# Patient Record
Sex: FEMALE | Race: WHITE | NOT HISPANIC OR LATINO
[De-identification: names, ages, dates, MRNs, and addresses within clinical notes are randomized per-mention and may not be internally consistent; named-entity substitution may affect disease eponyms.]

---

## 2019-05-20 ENCOUNTER — FORM ENCOUNTER (OUTPATIENT)
Age: 29
End: 2019-05-20

## 2021-01-20 ENCOUNTER — NON-APPOINTMENT (OUTPATIENT)
Age: 31
End: 2021-01-20

## 2021-01-21 ENCOUNTER — TRANSCRIPTION ENCOUNTER (OUTPATIENT)
Age: 31
End: 2021-01-21

## 2021-01-21 ENCOUNTER — APPOINTMENT (OUTPATIENT)
Dept: BREAST CENTER | Facility: CLINIC | Age: 31
End: 2021-01-21
Payer: COMMERCIAL

## 2021-01-21 VITALS
HEART RATE: 99 BPM | WEIGHT: 115 LBS | SYSTOLIC BLOOD PRESSURE: 120 MMHG | HEIGHT: 64 IN | BODY MASS INDEX: 19.63 KG/M2 | DIASTOLIC BLOOD PRESSURE: 85 MMHG

## 2021-01-21 DIAGNOSIS — Z80.1 FAMILY HISTORY OF MALIGNANT NEOPLASM OF TRACHEA, BRONCHUS AND LUNG: ICD-10-CM

## 2021-01-21 DIAGNOSIS — Z80.8 FAMILY HISTORY OF MALIGNANT NEOPLASM OF OTHER ORGANS OR SYSTEMS: ICD-10-CM

## 2021-01-21 DIAGNOSIS — Z78.9 OTHER SPECIFIED HEALTH STATUS: ICD-10-CM

## 2021-01-21 DIAGNOSIS — Z80.7 FAMILY HISTORY OF OTHER MALIGNANT NEOPLASMS OF LYMPHOID, HEMATOPOIETIC AND RELATED TISSUES: ICD-10-CM

## 2021-01-21 PROCEDURE — 99072 ADDL SUPL MATRL&STAF TM PHE: CPT

## 2021-01-21 PROCEDURE — 99203 OFFICE O/P NEW LOW 30 MIN: CPT

## 2021-01-21 RX ORDER — LEVONORGESTREL 52 MG/1
INTRAUTERINE DEVICE INTRAUTERINE
Refills: 0 | Status: ACTIVE | COMMUNITY

## 2021-01-24 NOTE — ASSESSMENT
[FreeTextEntry1] : 30yo female w/ FHx breast ca mother 42 w/ recurrence 45 (Genetics (-)) and PAunt 50s/60s previously seen by Dr. Ramos for palpable lesion which was aspirated and yielded a proliferative breast lesion favoring fibroadenoma. Patient presents for breast cancer screening and denies skin changes or nipple discharge bilaterally. Physical examination reveals L UOQ FA confirmed on in office US. Patient to have Goran MG & US for high risk screening d/t her maternal hx of breast ca at 42. In addition, patient is to return in 6mos for MRI and re-examination.

## 2021-01-24 NOTE — PHYSICAL EXAM
[Supple] : supple [No Supraclavicular Adenopathy] : no supraclavicular adenopathy [No Cervical Adenopathy] : no cervical adenopathy [Examined in the supine and seated position] : examined in the supine and seated position [No Axillary Lymphadenopathy] : no left axillary lymphadenopathy [de-identified] : dense [de-identified] : dense UOQ [de-identified] : FNA proven FA 1.61cm x 3.7cm tranverse and 1.9cm x 3.5cm transverse on in office US

## 2021-01-24 NOTE — PAST MEDICAL HISTORY
[Menarche Age ____] : age at menarche was [unfilled] [Definite ___ (Date)] : the last menstrual period was [unfilled] [Total Preg ___] : G[unfilled] [FreeTextEntry7] : YES

## 2021-01-24 NOTE — HISTORY OF PRESENT ILLNESS
[FreeTextEntry1] : 30yo female w/ FHx breast ca mother 42 w/ recurrence 45 (Genetics (-)) and PAunt 50s/60s previously seen by Dr. Ramos for palpable lesion which was aspirated and yielded a proliferative breast lesion favoring fibroadenoma. Patient presents for breast cancer screening and denies skin changes or nipple discharge bilaterally.\par \par FERMIN Tej Schwarzck Lifetime Risk Score 28.3%

## 2021-02-03 ENCOUNTER — NON-APPOINTMENT (OUTPATIENT)
Age: 31
End: 2021-02-03

## 2021-02-15 ENCOUNTER — RESULT REVIEW (OUTPATIENT)
Age: 31
End: 2021-02-15

## 2021-02-24 ENCOUNTER — LABORATORY RESULT (OUTPATIENT)
Age: 31
End: 2021-02-24

## 2021-02-25 ENCOUNTER — APPOINTMENT (OUTPATIENT)
Dept: BREAST CENTER | Facility: CLINIC | Age: 31
End: 2021-02-25
Payer: COMMERCIAL

## 2021-02-25 VITALS
WEIGHT: 115 LBS | DIASTOLIC BLOOD PRESSURE: 81 MMHG | HEART RATE: 94 BPM | HEIGHT: 64 IN | SYSTOLIC BLOOD PRESSURE: 121 MMHG | BODY MASS INDEX: 19.63 KG/M2

## 2021-02-25 DIAGNOSIS — R92.2 INCONCLUSIVE MAMMOGRAM: ICD-10-CM

## 2021-02-25 PROCEDURE — 99213 OFFICE O/P EST LOW 20 MIN: CPT

## 2021-02-25 PROCEDURE — 99072 ADDL SUPL MATRL&STAF TM PHE: CPT

## 2021-03-03 NOTE — PHYSICAL EXAM
[Supple] : supple [No Supraclavicular Adenopathy] : no supraclavicular adenopathy [No Cervical Adenopathy] : no cervical adenopathy [Examined in the supine and seated position] : examined in the supine and seated position [No Axillary Lymphadenopathy] : no left axillary lymphadenopathy [de-identified] : dense [de-identified] : dense UOQ [de-identified] : bx proven FA 1.61cm x 2.7cm tranverse on in office US

## 2021-03-03 NOTE — PAST MEDICAL HISTORY
[Menarche Age ____] : age at menarche was [unfilled] [Total Preg ___] : G[unfilled] [Definite ___ (Date)] : the last menstrual period was [unfilled] [FreeTextEntry7] : YES

## 2021-03-03 NOTE — HISTORY OF PRESENT ILLNESS
[FreeTextEntry1] : 32yo female s/p L US bx 1:00 5FN which yielded fibroadenoma w/ FHx breast ca mother 42 w/ recurrence 45 (Genetics (-)) and PAunt 50s/60s Patient presents to discuss options for surgical excision of FA.\par \par FERMIN Tej Dupree Lifetime Risk Score 28.3%

## 2021-03-03 NOTE — ASSESSMENT
[FreeTextEntry1] : 30yo female w/ FHx breast ca mother 42 w/ recurrence 45 (Genetics (-)) and PAunt 50s/60s LOV 1/21/21 s/p L US bx 1:00 5FN which yielded fibroadenoma. Patient presents to office today to discuss options for surgical excision of FA. Physical examination reveals L UOQ FA on in office US. Reviewed imaging and pathology findings with patient and discussed, in depth, surgical options for excision of fibroadenoma. Patient wants to proceed with excision of the lesion and will obtain medical clearance and schedule the procedure today. Patient understands and agrees with the plan.\par

## 2021-03-03 NOTE — DATA REVIEWED
[FreeTextEntry1] : 5/21/19: L FNA 11:00 4FN: proliferative breast lesion, favoring fibroadenoma\par \par 1/29/21: [facility] & US ([facility]): heterogeneously dense, L - 4cm oval mass UOQ at site of palpable lump, US - R - 0.8cm cluster of cysts 10:00 5FN, 0.9cm oval hypoechoic mass or complicated cyst 11:00 1FN, L - 0.8cm hypoechoic oval mass 12:00 4FN, 0.5cm cluster of cysts 12:00 4FN, 4.3cm lobulated hypoechoic mass w/ parallel orientation 1:00 5FN, 0.8cm oval hypoechoic mass 2:00 7FN. BIRADS 4 - US bx rec for L 1:00 5FN 4.3cm mass, 6mo f/u goran targeted us rec for goran cystic and hypoechoic masses\par \par 2/15/21: L US bx: "heart shaped clip" fibroadenoma - benign and concordant, 6mo f/u targeted US rec

## 2021-03-09 ENCOUNTER — LABORATORY RESULT (OUTPATIENT)
Age: 31
End: 2021-03-09

## 2021-03-11 ENCOUNTER — TRANSCRIPTION ENCOUNTER (OUTPATIENT)
Age: 31
End: 2021-03-11

## 2021-03-12 ENCOUNTER — APPOINTMENT (OUTPATIENT)
Dept: BREAST CENTER | Facility: CLINIC | Age: 31
End: 2021-03-12

## 2021-03-12 ENCOUNTER — OUTPATIENT (OUTPATIENT)
Dept: OUTPATIENT SERVICES | Facility: HOSPITAL | Age: 31
LOS: 1 days | Discharge: HOME | End: 2021-03-12
Payer: COMMERCIAL

## 2021-03-12 ENCOUNTER — RESULT REVIEW (OUTPATIENT)
Age: 31
End: 2021-03-12

## 2021-03-12 PROCEDURE — 88307 TISSUE EXAM BY PATHOLOGIST: CPT | Mod: 26

## 2021-03-12 PROCEDURE — 76098 X-RAY EXAM SURGICAL SPECIMEN: CPT | Mod: 26

## 2021-03-12 PROCEDURE — 19120 REMOVAL OF BREAST LESION: CPT | Mod: LT

## 2021-03-12 PROCEDURE — 76998 US GUIDE INTRAOP: CPT | Mod: 26,59

## 2021-03-12 RX ORDER — OXYCODONE AND ACETAMINOPHEN 5; 325 MG/1; MG/1
5-325 TABLET ORAL
Qty: 10 | Refills: 0 | Status: DISCONTINUED | COMMUNITY
Start: 2021-03-12 | End: 2021-03-12

## 2021-03-16 LAB — SURGICAL PATHOLOGY STUDY: SIGNIFICANT CHANGE UP

## 2021-03-24 PROBLEM — Z98.890 S/P BREAST LUMPECTOMY: Status: ACTIVE | Noted: 2021-03-12

## 2021-03-24 PROBLEM — Z80.3 FAMILY HISTORY OF BREAST CANCER: Status: ACTIVE | Noted: 2021-01-21

## 2021-03-25 ENCOUNTER — APPOINTMENT (OUTPATIENT)
Dept: BREAST CENTER | Facility: CLINIC | Age: 31
End: 2021-03-25
Payer: COMMERCIAL

## 2021-03-25 VITALS
HEIGHT: 64 IN | DIASTOLIC BLOOD PRESSURE: 82 MMHG | HEART RATE: 90 BPM | WEIGHT: 115 LBS | BODY MASS INDEX: 19.63 KG/M2 | SYSTOLIC BLOOD PRESSURE: 119 MMHG

## 2021-03-25 DIAGNOSIS — Z09 ENCOUNTER FOR FOLLOW-UP EXAMINATION AFTER COMPLETED TREATMENT FOR CONDITIONS OTHER THAN MALIGNANT NEOPLASM: ICD-10-CM

## 2021-03-25 DIAGNOSIS — Z00.00 ENCOUNTER FOR GENERAL ADULT MEDICAL EXAMINATION W/OUT ABNORMAL FINDINGS: ICD-10-CM

## 2021-03-25 DIAGNOSIS — Z98.890 OTHER SPECIFIED POSTPROCEDURAL STATES: ICD-10-CM

## 2021-03-25 DIAGNOSIS — Z80.3 FAMILY HISTORY OF MALIGNANT NEOPLASM OF BREAST: ICD-10-CM

## 2021-03-25 PROCEDURE — 99024 POSTOP FOLLOW-UP VISIT: CPT

## 2021-03-25 RX ORDER — OXYCODONE AND ACETAMINOPHEN 5; 325 MG/1; MG/1
5-325 TABLET ORAL
Qty: 10 | Refills: 0 | Status: DISCONTINUED | COMMUNITY
Start: 2021-03-12 | End: 2021-03-25

## 2021-08-05 ENCOUNTER — APPOINTMENT (OUTPATIENT)
Dept: BREAST CENTER | Facility: CLINIC | Age: 31
End: 2021-08-05

## 2021-09-23 ENCOUNTER — APPOINTMENT (OUTPATIENT)
Dept: BREAST CENTER | Facility: CLINIC | Age: 31
End: 2021-09-23
Payer: COMMERCIAL

## 2021-09-23 PROCEDURE — 99213 OFFICE O/P EST LOW 20 MIN: CPT

## 2021-09-23 NOTE — DATA REVIEWED
[FreeTextEntry1] : 5/21/19: L FNA 11:00 4FN: proliferative breast lesion, favoring fibroadenoma\par \par 1/29/21: [facility] & US ([facility]): heterogeneously dense, L - 4cm oval mass UOQ at site of palpable lump, US - R - 0.8cm cluster of cysts 10:00 5FN, 0.9cm oval hypoechoic mass or complicated cyst 11:00 1FN, L - 0.8cm hypoechoic oval mass 12:00 4FN, 0.5cm cluster of cysts 12:00 4FN, 4.3cm lobulated hypoechoic mass w/ parallel orientation 1:00 5FN, 0.8cm oval hypoechoic mass 2:00 7FN. BIRADS 4 - US bx rec for L 1:00 5FN 4.3cm mass, 6mo f/u goran targeted us rec for goran cystic and hypoechoic masses\par \par 2/15/21: L US bx: "heart shaped clip" fibroadenoma - benign and concordant, 6mo f/u targeted US rec\par \par 3/12/21: L Lumpx: 4cm fibroadenoma\par \par 9/23/2021: MRI for high risk screening.  Significant background enhancement with several foci of enhancement with type III kinetics.  Given otherwise benign appearance, recommend short interval FU in 6 months; probably benign.

## 2021-09-23 NOTE — ASSESSMENT
[FreeTextEntry1] : Benign FU exam but today's MRI report recommends FU MRI in 6 months.Will go ahead and set that up.  Whether or not to repeat Mammo will depend on rec from radiology.  Patient understands and agrees with the plan.\par

## 2021-09-23 NOTE — PAST MEDICAL HISTORY
[Menarche Age ____] : age at menarche was [unfilled] [Total Preg ___] : G[unfilled] [FreeTextEntry1] : PT NO LONGER GETS MENSTRUAL DUE TO IUD [FreeTextEntry6] : no [FreeTextEntry7] : YES (MIRENA, CURRENTLY) [FreeTextEntry8] : N/A

## 2021-09-23 NOTE — HISTORY OF PRESENT ILLNESS
[FreeTextEntry1] : 32yo female LOV 3/25/21 presents for f/u evaluation s/p L lumpx 3/12/21 which yielded 4cm fibroadenoma. Patient w/ FHx breast ca mother 42 w/ recurrence 45 (Genetics (-)) and PAunt 50s/60s.\par \par FERMIN Tej Dupree Lifetime Risk Score 28.3%

## 2021-09-23 NOTE — PHYSICAL EXAM
[Supple] : supple [No Supraclavicular Adenopathy] : no supraclavicular adenopathy [No Cervical Adenopathy] : no cervical adenopathy [Examined in the supine and seated position] : examined in the supine and seated position [No Axillary Lymphadenopathy] : no left axillary lymphadenopathy [No dominant masses] : no dominant masses in right breast  [No dominant masses] : no dominant masses left breast [No Nipple Retraction] : no left nipple retraction [No Nipple Discharge] : no left nipple discharge [de-identified] : dense [de-identified] : dense UOQ [de-identified] : well-healing periareolar incision with no erythema, drainage, or dehiscence

## 2022-04-26 ENCOUNTER — APPOINTMENT (OUTPATIENT)
Dept: BREAST CENTER | Facility: CLINIC | Age: 32
End: 2022-04-26
Payer: SELF-PAY

## 2022-04-26 PROCEDURE — 99213 OFFICE O/P EST LOW 20 MIN: CPT

## 2022-04-27 NOTE — HISTORY OF PRESENT ILLNESS
[FreeTextEntry1] : 32 yo female presents for f/u evaluation s/p L lumpx 3/12/21 which yielded 4cm fibroadenoma. Patient w/ FHx breast ca mother 42 w/ recurrence 45 (Genetics (-)) and PAunt 50s/60s. MMG/US performed today BIRADS 2 Benign. Denies any palpable abnormalities, skin changes, nipple changes or nipple discharge bilaterally.\par \par FERMIN Tyrer Juleezick Lifetime Risk Score 28.3%

## 2022-04-27 NOTE — REVIEW OF SYSTEMS
[As Noted in HPI] : as noted in HPI [Negative] : Constitutional [Shortness Of Breath] : no shortness of breath

## 2022-04-27 NOTE — DATA REVIEWED
[FreeTextEntry1] : 5/21/19: L FNA 11:00 4FN: proliferative breast lesion, favoring fibroadenoma\par \par 1/29/21: [facility] & US ([facility]): heterogeneously dense, L - 4cm oval mass UOQ at site of palpable lump, US - R - 0.8cm cluster of cysts 10:00 5FN, 0.9cm oval hypoechoic mass or complicated cyst 11:00 1FN, L - 0.8cm hypoechoic oval mass 12:00 4FN, 0.5cm cluster of cysts 12:00 4FN, 4.3cm lobulated hypoechoic mass w/ parallel orientation 1:00 5FN, 0.8cm oval hypoechoic mass 2:00 7FN. BIRADS 4 - US bx rec for L 1:00 5FN 4.3cm mass, 6mo f/u goran targeted us rec for goran cystic and hypoechoic masses\par \par 2/15/21: L US bx: "heart shaped clip" fibroadenoma - benign and concordant, 6mo f/u targeted US rec\par \par 3/12/21: L Lumpx: 4cm fibroadenoma\par \par 9/23/2021: MRI for high risk screening.  Significant background enhancement with several foci of enhancement with type III kinetics.  Given otherwise benign appearance, recommend short interval FU in 6 months; probably benign.\par \par 4/26/22 B/L MMG/US ([facility]): No mammographic or US evidence of malignancy. BIRADS 2: Benign. Follow up with annual screening.\par

## 2022-04-27 NOTE — PHYSICAL EXAM
[Supple] : supple [No Supraclavicular Adenopathy] : no supraclavicular adenopathy [No Cervical Adenopathy] : no cervical adenopathy [Examined in the supine and seated position] : examined in the supine and seated position [No dominant masses] : no dominant masses in right breast  [No dominant masses] : no dominant masses left breast [No Nipple Retraction] : no left nipple retraction [No Nipple Discharge] : no left nipple discharge [No Axillary Lymphadenopathy] : no left axillary lymphadenopathy [de-identified] : dense [de-identified] : dense UOQ [de-identified] : well-healed periareolar incision with no erythema, drainage, or dehiscence

## 2022-04-27 NOTE — ASSESSMENT
[FreeTextEntry1] : 32 yo female presents for f/u evaluation s/p L lumpx 3/12/21 which yielded 4cm fibroadenoma. Patient w/ FHx breast ca mother 42 w/ recurrence 45 (Genetics (-)) and PAunt 50s/60s. MMG/US performed today BIRADS 2 Benign. Patient is without complaint, physical exam WNL, FERMIN 28.3%. Given patient's high risk status she will have B/L MRI in Sept 2022 and RTC same month for reexamination. Patient verbalized understanding and agreement of plan.\par

## 2022-09-19 ENCOUNTER — NON-APPOINTMENT (OUTPATIENT)
Age: 32
End: 2022-09-19

## 2022-10-04 ENCOUNTER — APPOINTMENT (OUTPATIENT)
Dept: BREAST CENTER | Facility: CLINIC | Age: 32
End: 2022-10-04

## 2022-10-24 ENCOUNTER — APPOINTMENT (OUTPATIENT)
Dept: BREAST CENTER | Facility: CLINIC | Age: 32
End: 2022-10-24

## 2022-10-24 ENCOUNTER — NON-APPOINTMENT (OUTPATIENT)
Age: 32
End: 2022-10-24

## 2022-10-24 VITALS
HEIGHT: 64 IN | BODY MASS INDEX: 20.14 KG/M2 | SYSTOLIC BLOOD PRESSURE: 119 MMHG | WEIGHT: 118 LBS | DIASTOLIC BLOOD PRESSURE: 77 MMHG | HEART RATE: 59 BPM

## 2022-10-24 DIAGNOSIS — Z12.39 ENCOUNTER FOR OTHER SCREENING FOR MALIGNANT NEOPLASM OF BREAST: ICD-10-CM

## 2022-10-24 PROCEDURE — 99213 OFFICE O/P EST LOW 20 MIN: CPT

## 2022-10-24 NOTE — PHYSICAL EXAM
[Supple] : supple [No Supraclavicular Adenopathy] : no supraclavicular adenopathy [No Cervical Adenopathy] : no cervical adenopathy [Examined in the supine and seated position] : examined in the supine and seated position [No dominant masses] : no dominant masses in right breast  [No dominant masses] : no dominant masses left breast [No Nipple Retraction] : no left nipple retraction [No Nipple Discharge] : no left nipple discharge [No Axillary Lymphadenopathy] : no left axillary lymphadenopathy [de-identified] : dense [de-identified] : dense UOQ [de-identified] : well-healed periareolar incision with no erythema, drainage, or dehiscence

## 2022-10-24 NOTE — ASSESSMENT
[FreeTextEntry1] : 31 yo female presents for breast cancer screening with history of L 4.0 cm fibroadenoma excision on 3/12/21. Patient w/ FHx breast ca mother 42 w/ recurrence 45 (Genetics negative) and PAunt 50s/60s. MMG/US performed 4/26/22 BIRADS 2 Benign, B/L MRI performed today report pending. Patient is without complaint, physical exam WNL. MRI report from today still pending. Will contact patient with result and if benign, she will have B/L sMMG/US in April 2023 and RTC same month for reexamination. Patient verbalized understanding and agreement of plan.\par

## 2022-10-24 NOTE — DATA REVIEWED
[FreeTextEntry1] : 5/21/19: L FNA 11:00 4FN: proliferative breast lesion, favoring fibroadenoma\par \par 1/29/21: [facility] & US ([facility]): heterogeneously dense, L - 4cm oval mass UOQ at site of palpable lump, US - R - 0.8cm cluster of cysts 10:00 5FN, 0.9cm oval hypoechoic mass or complicated cyst 11:00 1FN, L - 0.8cm hypoechoic oval mass 12:00 4FN, 0.5cm cluster of cysts 12:00 4FN, 4.3cm lobulated hypoechoic mass w/ parallel orientation 1:00 5FN, 0.8cm oval hypoechoic mass 2:00 7FN. BIRADS 4 - US bx rec for L 1:00 5FN 4.3cm mass, 6mo f/u goran targeted us rec for goran cystic and hypoechoic masses\par \par 2/15/21: L US bx: "heart shaped clip" fibroadenoma - benign and concordant, 6mo f/u targeted US rec\par \par 3/12/21: L Lumpx: 4cm fibroadenoma\par \par 9/23/2021: MRI for high risk screening.  Significant background enhancement with several foci of enhancement with type III kinetics.  Given otherwise benign appearance, recommend short interval FU in 6 months; probably benign.\par \par 4/26/22 B/L MMG/US ([facility]): No mammographic or US evidence of malignancy. BIRADS 2: Benign. Follow up with annual screening.\par \par 10/24/22 B/L MRI ([facility]): PENDING

## 2022-10-24 NOTE — HISTORY OF PRESENT ILLNESS
[FreeTextEntry1] : 31 yo female presents for breast cancer screening with history of L 4.0 cm fibroadenoma excision on 3/12/21. Patient w/ FHx breast ca mother 42 w/ recurrence 45 (Genetics negative) and PAunt 50s/60s. MMG/US performed 4/26/22 BIRADS 2 Benign, B/L MRI performed today, final report pending. Denies any palpable abnormalities, skin changes, nipple changes or nipple discharge bilaterally.\par \par FERMIN Tej Schwarzck Lifetime Risk Score 28.3%

## 2022-10-25 ENCOUNTER — NON-APPOINTMENT (OUTPATIENT)
Age: 32
End: 2022-10-25

## 2022-11-10 ENCOUNTER — NON-APPOINTMENT (OUTPATIENT)
Age: 32
End: 2022-11-10

## 2023-03-28 ENCOUNTER — NON-APPOINTMENT (OUTPATIENT)
Age: 33
End: 2023-03-28

## 2023-03-31 ENCOUNTER — NON-APPOINTMENT (OUTPATIENT)
Age: 33
End: 2023-03-31

## 2023-04-04 ENCOUNTER — APPOINTMENT (OUTPATIENT)
Dept: BREAST CENTER | Facility: CLINIC | Age: 33
End: 2023-04-04

## 2023-05-12 PROBLEM — R92.8 ABNORMAL FINDING ON BREAST IMAGING: Status: ACTIVE | Noted: 2021-02-03

## 2023-05-16 ENCOUNTER — APPOINTMENT (OUTPATIENT)
Dept: BREAST CENTER | Facility: CLINIC | Age: 33
End: 2023-05-16
Payer: COMMERCIAL

## 2023-05-16 VITALS
BODY MASS INDEX: 20.14 KG/M2 | HEIGHT: 64 IN | SYSTOLIC BLOOD PRESSURE: 115 MMHG | WEIGHT: 118 LBS | DIASTOLIC BLOOD PRESSURE: 75 MMHG | HEART RATE: 76 BPM

## 2023-05-16 DIAGNOSIS — R92.8 OTHER ABNORMAL AND INCONCLUSIVE FINDINGS ON DIAGNOSTIC IMAGING OF BREAST: ICD-10-CM

## 2023-05-16 PROCEDURE — 99213 OFFICE O/P EST LOW 20 MIN: CPT

## 2023-05-17 NOTE — HISTORY OF PRESENT ILLNESS
[FreeTextEntry1] : 34 yo female presents for breast cancer screening with history of L 4.0 cm fibroadenoma excision on 3/12/21. Patient w/ FHx breast ca mother 42 w/ recurrence 45 (Genetics negative) and PAunt 50s/60s. Denies any palpable abnormalities, skin changes, nipple changes or nipple discharge bilaterally.\par \par B/L MRI performed Nov 2022\par \par B/L sMMG/US performed today BIRADS 2. \par \par FERMIN Tej Dupree Lifetime Risk Score 28.3%

## 2023-05-17 NOTE — ASSESSMENT
[FreeTextEntry1] : 33 yo female presents for breast cancer screening with history of L 4.0 cm fibroadenoma excision on 3/12/21. Patient w/ FHx breast ca mother 42 w/ recurrence 45 (Genetics negative) and PAunt 50s/60s. B/L MRI performed Nov 2022 initially recommended biopsy which was cancelled. B/L sMMG/US performed today BIRADS 2. Patient currently undergoing workup for masses on her ovary at [facility]. Discussed importance and implication of genetic testing in regards to her family history and offspring. Patient would like to go forward with genetic counselor consult. Patient will have B/L MRI in 6 months and reexamination at this time. Patient verbalized understanding and agreement of plan.\par

## 2023-05-17 NOTE — PAST MEDICAL HISTORY
[Menarche Age ____] : age at menarche was [unfilled] [Total Preg ___] : G[unfilled] [Definite ___ (Date)] : the last menstrual period was [unfilled] [History of Hormone Replacement Treatment] : has no history of hormone replacement treatment [FreeTextEntry1] : PT NO LONGER GETS MENSTRUAL DUE TO IUD [FreeTextEntry6] : no [FreeTextEntry7] : YES (MIRENA, CURRENTLY) [FreeTextEntry8] : N/A

## 2023-11-09 ENCOUNTER — NON-APPOINTMENT (OUTPATIENT)
Age: 33
End: 2023-11-09

## 2023-11-14 ENCOUNTER — APPOINTMENT (OUTPATIENT)
Dept: BREAST CENTER | Facility: CLINIC | Age: 33
End: 2023-11-14
Payer: COMMERCIAL

## 2023-11-14 VITALS
HEIGHT: 64 IN | BODY MASS INDEX: 19.63 KG/M2 | HEART RATE: 73 BPM | SYSTOLIC BLOOD PRESSURE: 130 MMHG | DIASTOLIC BLOOD PRESSURE: 85 MMHG | WEIGHT: 115 LBS

## 2023-11-14 PROCEDURE — 99213 OFFICE O/P EST LOW 20 MIN: CPT

## 2024-05-30 PROBLEM — D24.1 FIBROADENOMA OF RIGHT BREAST: Status: ACTIVE | Noted: 2021-01-21

## 2024-05-30 PROBLEM — Z80.3 FAMILY HISTORY OF BREAST CANCER: Status: ACTIVE | Noted: 2021-01-21

## 2024-05-30 PROBLEM — Z91.89 AT HIGH RISK FOR BREAST CANCER: Status: ACTIVE | Noted: 2022-04-21

## 2024-06-04 ENCOUNTER — APPOINTMENT (OUTPATIENT)
Dept: BREAST CENTER | Facility: CLINIC | Age: 34
End: 2024-06-04
Payer: COMMERCIAL

## 2024-06-04 VITALS
WEIGHT: 118 LBS | HEIGHT: 64 IN | HEART RATE: 80 BPM | DIASTOLIC BLOOD PRESSURE: 74 MMHG | SYSTOLIC BLOOD PRESSURE: 117 MMHG | BODY MASS INDEX: 20.14 KG/M2

## 2024-06-04 DIAGNOSIS — Z91.89 OTHER SPECIFIED PERSONAL RISK FACTORS, NOT ELSEWHERE CLASSIFIED: ICD-10-CM

## 2024-06-04 DIAGNOSIS — D24.1 BENIGN NEOPLASM OF RIGHT BREAST: ICD-10-CM

## 2024-06-04 DIAGNOSIS — Z80.3 FAMILY HISTORY OF MALIGNANT NEOPLASM OF BREAST: ICD-10-CM

## 2024-06-04 PROCEDURE — 99213 OFFICE O/P EST LOW 20 MIN: CPT

## 2024-06-10 NOTE — HISTORY OF PRESENT ILLNESS
[FreeTextEntry1] : 33 yo female presents for breast cancer screening with history of L 4.0 cm fibroadenoma excision on 3/12/21. Patient w/ FHx breast ca mother 42 w/ recurrence 45 (mom and sister Genetics negative) and PAunt 50s/60s. Denies any palpable abnormalities, skin changes, nipple changes or nipple discharge bilaterally. Patient is 4 weeks pregnant at this time, due late January 2025.   B/L MRI performed Nov 2022, patient likely pregnant at LOV so MRI not performed. B/L sMMG/US 6/4/24 BIRADS 2.  FERMIN Tej [facility] Lifetime Risk Score 28.3%

## 2024-06-10 NOTE — ASSESSMENT
[FreeTextEntry1] : 35 yo female presents for breast cancer screening with history of L 4.0 cm fibroadenoma excision on 3/12/21. Patient w/ FHx breast ca mother 42 w/ recurrence 45 (Genetics negative) and PAunt 50s/60s.   Discussed importance and implication of genetic testing in regard to her family history and offspring. Patient would like to go forward with genetic counselor consult.  B/L MRI performed Nov 2022 initially recommended biopsy which was cancelled. Patient likely pregnant at LOV so MRI not performed at the time. B/L sMMG/US 6/4/24 BIRADS 2. Plan for re-examination in 6 months. Will then discuss the plan for radiologic imaging, likely including B/L MRI following pregnancy (baby is due January 2025). Patient verbalizes understanding and is in agreement with the plan.

## 2024-06-10 NOTE — DATA REVIEWED
[FreeTextEntry1] : 5/21/19: L FNA 11:00 4FN: proliferative breast lesion, favoring fibroadenoma  1/29/21: Goran [facility] & US ([facility]): heterogeneously dense, L - 4cm oval mass UOQ at site of palpable lump, US - R - 0.8cm cluster of cysts 10:00 5FN, 0.9cm oval hypoechoic mass or complicated cyst 11:00 1FN, L - 0.8cm hypoechoic oval mass 12:00 4FN, 0.5cm cluster of cysts 12:00 4FN, 4.3cm lobulated hypoechoic mass w/ parallel orientation 1:00 5FN, 0.8cm oval hypoechoic mass 2:00 7FN. BIRADS 4 - US bx rec for L 1:00 5FN 4.3cm mass, 6mo f/u goran targeted us rec for goran cystic and hypoechoic masses  2/15/21: L US bx: "heart shaped clip" fibroadenoma - benign and concordant, 6mo f/u targeted US rec  3/12/21: L Lumpx: 4cm fibroadenoma  9/23/2021: MRI for high risk screening.  Significant background enhancement with several foci of enhancement with type III kinetics.  Given otherwise benign appearance, recommend short interval FU in 6 months; probably benign.  4/26/22 B/L MMG/US ([facility]): No mammographic or US evidence of malignancy. BIRADS 2: Benign. Follow up with annual screening.  10/24/22 B/L MRI ([facility]): Clumped linear nonmass enhancement in both breasts, upper central.   Recommend biopsy be scheduled between day 7 and day 14 of patient's menstrual cycle. If the findings are not reproduced at correct timing then biopsy will be canceled. BIRADS 4.   11/4/23 MR Biopsy (R): CANCELLED. A 2 site MR biopsies not performed secondary to lack of suspicious findings within the upper central breasts bilaterally. A bilateral breast MRI with contrast in 6 months recommended to assess these areas as well as the stable, likely areas of background parenchymal enhancement, identified on prior MRIs.  5/16/23 B/L sMMG/US ([facility]): No mammographic or US evidence of malignancy. Follow up with annual screening. BIRADS 2.  6/4/24 ([facility]) B/L sMMG/US: See full report for b/l benign findings. No mammographic or US evidence of malignancy. Follow up with annual screening. BIRADS 2.

## 2024-06-10 NOTE — PHYSICAL EXAM
[Supple] : supple [No Supraclavicular Adenopathy] : no supraclavicular adenopathy [No Cervical Adenopathy] : no cervical adenopathy [Examined in the supine and seated position] : examined in the supine and seated position [No dominant masses] : no dominant masses in right breast  [No dominant masses] : no dominant masses left breast [No Nipple Retraction] : no left nipple retraction [No Nipple Discharge] : no left nipple discharge [No Axillary Lymphadenopathy] : no left axillary lymphadenopathy [de-identified] : dense [de-identified] : dense UOQ [de-identified] : well-healed periareolar incision with no erythema, drainage, or dehiscence

## 2024-06-10 NOTE — PAST MEDICAL HISTORY
[Menarche Age ____] : age at menarche was [unfilled] [Definite ___ (Date)] : the last menstrual period was [unfilled] [Total Preg ___] : G[unfilled] [History of Hormone Replacement Treatment] : has no history of hormone replacement treatment [FreeTextEntry1] : PT NO LONGER GETS MENSTRUAL DUE TO IUD [FreeTextEntry2] : patient is currently 4 weeks pregnant [FreeTextEntry6] : no [FreeTextEntry7] : YES (MIRENA, CURRENTLY) [FreeTextEntry8] : N/A

## 2024-12-17 ENCOUNTER — APPOINTMENT (OUTPATIENT)
Dept: BREAST CENTER | Facility: CLINIC | Age: 34
End: 2024-12-17
Payer: COMMERCIAL

## 2024-12-17 VITALS
SYSTOLIC BLOOD PRESSURE: 132 MMHG | DIASTOLIC BLOOD PRESSURE: 91 MMHG | BODY MASS INDEX: 20.32 KG/M2 | WEIGHT: 119 LBS | HEART RATE: 120 BPM | HEIGHT: 64 IN

## 2024-12-17 DIAGNOSIS — Z91.89 OTHER SPECIFIED PERSONAL RISK FACTORS, NOT ELSEWHERE CLASSIFIED: ICD-10-CM

## 2024-12-17 DIAGNOSIS — Z80.3 FAMILY HISTORY OF MALIGNANT NEOPLASM OF BREAST: ICD-10-CM

## 2024-12-17 DIAGNOSIS — D24.1 BENIGN NEOPLASM OF RIGHT BREAST: ICD-10-CM

## 2024-12-17 DIAGNOSIS — R92.30 DENSE BREASTS, UNSPECIFIED: ICD-10-CM

## 2024-12-17 PROCEDURE — 99213 OFFICE O/P EST LOW 20 MIN: CPT
